# Patient Record
Sex: MALE | Race: WHITE | NOT HISPANIC OR LATINO | Employment: FULL TIME | URBAN - METROPOLITAN AREA
[De-identification: names, ages, dates, MRNs, and addresses within clinical notes are randomized per-mention and may not be internally consistent; named-entity substitution may affect disease eponyms.]

---

## 2018-06-10 ENCOUNTER — HOSPITAL ENCOUNTER (EMERGENCY)
Facility: CLINIC | Age: 38
Discharge: HOME | End: 2018-06-10
Attending: EMERGENCY MEDICINE | Admitting: EMERGENCY MEDICINE
Payer: OTHER MISCELLANEOUS

## 2018-06-10 VITALS
RESPIRATION RATE: 16 BRPM | SYSTOLIC BLOOD PRESSURE: 112 MMHG | DIASTOLIC BLOOD PRESSURE: 82 MMHG | HEART RATE: 82 BPM | TEMPERATURE: 97.7 F | OXYGEN SATURATION: 99 %

## 2018-06-10 DIAGNOSIS — S06.0X0A CONCUSSION WITHOUT LOSS OF CONSCIOUSNESS, INITIAL ENCOUNTER: ICD-10-CM

## 2018-06-10 PROCEDURE — 99283 EMERGENCY DEPT VISIT LOW MDM: CPT

## 2018-06-10 RX ORDER — IBUPROFEN 600 MG/1
600 TABLET, FILM COATED ORAL EVERY 6 HOURS PRN
Qty: 30 TABLET | Refills: 1 | Status: SHIPPED | OUTPATIENT
Start: 2018-06-10

## 2018-06-10 RX ORDER — ONDANSETRON 4 MG/1
4 TABLET, ORALLY DISINTEGRATING ORAL EVERY 8 HOURS PRN
Qty: 10 TABLET | Refills: 0 | Status: SHIPPED | OUTPATIENT
Start: 2018-06-10 | End: 2018-06-13

## 2018-06-10 ASSESSMENT — ENCOUNTER SYMPTOMS
FATIGUE: 1
NECK PAIN: 1
HEADACHES: 1
NAUSEA: 1
VOMITING: 0
PHOTOPHOBIA: 1

## 2018-06-10 NOTE — ED PROVIDER NOTES
History     Chief Complaint:  Head injury    HPI   Yousif Mayo is a 38 year old male who presents with a head injury. The patient reports that 10 days ago he was crouched down picking up some tools while at work when he stood up quickly, hitting his head against a metal pipe. He was wearing a hard hat at the time. The patient did see his PCP initially following this incident. He now presents here to the ED after continuing to experience symptoms including sensitivity to light, neck pain, nausea, pressure like sensation in his head, and slight balance issues. He states he has seen a chiropractor for an adjustment since this incident, but that this did not alleviate his symptoms well. The patient's wife does note that he has been napping and fatigued since his initial injury, which is abnormal for him.     Allergies:  Penicillin G     Medications:    Lexapro  Nicorette     Past Medical History:    Alcohol abuse  Narcotic abuse  Tobacco use disorder    Past Surgical History:    History reviewed. No pertinent past surgical history.    Family History:    GI disease    Social History:  The patient was accompanied to the ED by his wife.  Smoking Status: Former  Smokeless Tobacco: No  Alcohol Use: No  Marital Status:  Single     Review of Systems   Constitutional: Positive for fatigue.   Eyes: Positive for photophobia.   Gastrointestinal: Positive for nausea. Negative for vomiting.   Musculoskeletal: Positive for neck pain.   Neurological: Positive for headaches.   All other systems reviewed and are negative.    Physical Exam     Patient Vitals for the past 24 hrs:   BP Temp Temp src Heart Rate Resp SpO2   06/10/18 1027 123/89 97.7  F (36.5  C) Temporal 84 16 97 %      Physical Exam  General: Patient is alert and interactive when I enter the room  Head:  The scalp, face, and head appear normal  Eyes:  The pupils are equal, round, and reactive to light    Conjunctivae and sclerae are normal  ENT:    External  acoustic canals are normal    The oropharynx is normal without erythema.     Uvula is in the midline  Neck:  Normal range of motion  CV:  Regular rate. S1/S2. No murmurs.   Resp:  Lungs are clear without wheezes or rales. No distress  GI:  Abdomen is soft, no rigidity, guarding, or rebound    No distension. No tenderness to palpation in any quadrant.     MS:  Normal tone. Joints grossly normal without effusions.     No asymmetric leg swelling, calf or thigh tenderness.      Normal motor assessment of all extremities.  Skin:  No rash or lesions noted. Normal capillary refill noted  Neuro: Speech is normal and fluent. Face is symmetric.     Moving all extremities well. CN's are intact. 5/5 UE and LE strength.   Psych:  Awake. Alert.  Normal affect.  Appropriate interactions.  Lymph: No anterior cervical lymphadenopathy noted        Emergency Department Course     Emergency Department Course:  Nursing notes and vitals reviewed.  1107 I had my initial encounter with the patient.  I performed an exam of the patient as documented above.     1127 I discussed the treatment plan with the patient. They expressed understanding of this plan and consented to discharge. They will be discharged home with instructions for care and follow up. In addition, the patient will return to the emergency department with any new or worsening symptoms.  All questions were answered.    Impression & Plan      Medical Decision Making:  Yousif Mayo is a 38 year old male who presents for evaluation after stood up into pipe with trauma to the head.  This patient has a history per a previous workup of concussion.  The differential diagnosis includes skull fracture, epidural hematoma, subdural hematoma, intracerebral hemorrhage, and traumatic subarachnoid hemorrhage; all of these are highly unlikely in this clinical setting.  By the [place] head CT rules they do not warrant head ct imaging and discussed risk/benefit ratio with patient/family  in detail.  Neuro exam is normal here.  I discussed that, if he does in fact have a concussion, the next step in workup.  I doubt imaging 10 days out is vital in the ED today.    Return to ED for red flags (change in behavior, drowsiness, seizures, vomiting, etc) and gave concussion precautions for home.  I did stress importance of avoiding a second concussion while brain heals.  The patients head to toe trauma exam is otherwise negative for serious underlying disease of the head, neck, chest, abdomen, extremities, pelvis        Diagnosis:    ICD-10-CM    1. Concussion without loss of consciousness, initial encounter S06.0X0A      Disposition:   Discharge    Discharge Medications:  New Prescriptions    IBUPROFEN (ADVIL/MOTRIN) 600 MG TABLET    Take 1 tablet (600 mg) by mouth every 6 hours as needed for moderate pain    ONDANSETRON (ZOFRAN ODT) 4 MG ODT TAB    Take 1 tablet (4 mg) by mouth every 8 hours as needed for nausea     Scribe Disclosure:  I, Doc Paulson, am serving as a scribe at 11:06 AM on 6/10/2018 to document services personally performed by Sahil Vasquez MD, based on my observations and the provider's statements to me.       Sahil Vasquez MD  06/10/18 8052

## 2018-06-10 NOTE — DISCHARGE INSTRUCTIONS
Please see your primary care doctor Tuesday for a recheck.      It was my pleasure to take care of you today. Thank you for visiting [facility] Emergency Room.     Sahil Vasquez MD      Concussion Instructions:    Today you saw Sahil Vasquez MD for symptoms of a concussion. Symptoms may include: headache, confusion, feeling sick to your stomach, vomiting (throwing up) and problems with memory, concentration or sleeping. You may feel dizzy, irritable, and tired. Children and teens may need help from their parents, teachers, coaches and others to watch for symptoms as they recover.   Follow-up  Please visit the clinic below for follow-up care. Schedule this visit within the next 2-3 day(s).   Your doctor. If you do not have a doctor, you may choose a clinic at [email or web address], or call [phone], or [phone].      Warning signs  Call your doctor or come back to the Emergency Department if you suddenly have any   of these symptoms:    Headaches that get worse    Feeling more and more drowsy    You keep repeating yourself    Strange behavior    Seizures    Repeat vomiting (throwing up)    Growing confusion    Feeling more irritable    Neck pain that gets worse    Slurred speech    Weakness or numbness    Loss of consciousness  Fluid or blood coming from ears or nose  Self-care    Get lots of rest. Be sure to get enough sleep at night.  Take daytime naps or rest if you feel tired.    Limit physical activity and  thinking  activities. These can make symptoms worse.  - Physical activity includes gym, sports, weight training, running, exercise and heavy lifting.  - Thinking activities include homework, class work and job-related work.    Maintain a healthy diet and drink lots of fluids.      As symptoms improve, you may slowly return to your daily activities. If symptoms get worse   or return, reduce your activities.     Know that it is normal to feel sad and frustrated when you do not feel right and  are less active.           Yousif Mayo         06/10/2018    Going back to work    Your care team will tell you when to return to work based on your symptoms.     Limit the amount of work you do soon after your injury. This may speed healing.   It is important to get a lot of rest. You should also reduce your physical activity as   well as activities that require a lot of thinking or concentration.    At this time:  Return to work in 1 days.    Returning to sports    Never return to play if you have any symptoms. A full recovery will reduce the chances of getting hurt again. Remember, it is better to miss one or two games than a whole season.     After all of your symptoms are gone, and you wait another 7-14 days, it is usually safe to slowly return to activity. If symptoms return or worsen, stop the activity and see your doctor.    Important: If you are under age 18 and in an organized sport, you will need written consent from a doctor before you return to sports. Typically, this will be your primary care or sports medicine doctor. Please make an appointment.     Going back to school    If you are still having symptoms, you may need extra help at school.    Tell your teachers and school nurse about your injury and symptoms. Ask them to watch for problems with learning, memory and concentration. Symptoms may get worse when you do schoolwork, and you may become more irritable.     At this time: Return to school in 1 days.

## 2018-06-10 NOTE — ED AVS SNAPSHOT
[facility] Emergency Department    [address]    [facility] [address]    Phone:  [phone]    Fax:  [phone]                                       Yosuif Mayo   MRN: [id number]    Department:  [facility] Emergency Department   Date of Visit:  6/10/2018           After Visit Summary Signature Page     I have received my discharge instructions, and my questions have been answered. I have discussed any challenges I see with this plan with the nurse or doctor.    ..........................................................................................................................................  Patient/Patient Representative Signature      ..........................................................................................................................................  Patient Representative Print Name and Relationship to Patient    ..................................................               ................................................  Date                                            Time    ..........................................................................................................................................  Reviewed by Signature/Title    ...................................................              ..............................................  Date                                                            Time

## 2018-06-10 NOTE — ED AVS SNAPSHOT
[facility] Emergency Department    [address]    [facility] [address]    Phone:  [phone]    Fax:  [phone]                                       Yousif Mayo   MRN: [id number]    Department:  [facility] Emergency Department   Date of Visit:  6/10/2018           Patient Information     Date Of Birth          1980        Your diagnoses for this visit were:     Concussion without loss of consciousness, initial encounter        You were seen by Sahil Vasquez MD.      Follow-up Information     Follow up with [facility].    Contact information:    [address] [facility] [address]          Discharge Instructions       Please see your primary care doctor Tuesday for a recheck.      It was my pleasure to take care of you today. Thank you for visiting [facility] Emergency Room.     Sahil Vasquez MD      Concussion Instructions:    Today you saw Sahil Vasquez MD for symptoms of a concussion. Symptoms may include: headache, confusion, feeling sick to your stomach, vomiting (throwing up) and problems with memory, concentration or sleeping. You may feel dizzy, irritable, and tired. Children and teens may need help from their parents, teachers, coaches and others to watch for symptoms as they recover.   Follow-up  Please visit the clinic below for follow-up care. Schedule this visit within the next 2-3 day(s).   Your doctor. If you do not have a doctor, you may choose a clinic at [email or web address], or call [phone], or [phone].      Warning signs  Call your doctor or come back to the Emergency Department if you suddenly have any   of these symptoms:    Headaches that get worse    Feeling more and more drowsy    You keep repeating yourself    Strange behavior    Seizures    Repeat vomiting (throwing up)    Growing confusion    Feeling more irritable    Neck pain that gets worse    Slurred speech    Weakness or  numbness    Loss of consciousness  Fluid or blood coming from ears or nose  Self-care    Get lots of rest. Be sure to get enough sleep at night.  Take daytime naps or rest if you feel tired.    Limit physical activity and  thinking  activities. These can make symptoms worse.  - Physical activity includes gym, sports, weight training, running, exercise and heavy lifting.  - Thinking activities include homework, class work and job-related work.    Maintain a healthy diet and drink lots of fluids.      As symptoms improve, you may slowly return to your daily activities. If symptoms get worse   or return, reduce your activities.     Know that it is normal to feel sad and frustrated when you do not feel right and are less active.           Yousif Mayo         06/10/2018    Going back to work    Your care team will tell you when to return to work based on your symptoms.     Limit the amount of work you do soon after your injury. This may speed healing.   It is important to get a lot of rest. You should also reduce your physical activity as   well as activities that require a lot of thinking or concentration.    At this time:  Return to work in 1 days.    Returning to sports    Never return to play if you have any symptoms. A full recovery will reduce the chances of getting hurt again. Remember, it is better to miss one or two games than a whole season.     After all of your symptoms are gone, and you wait another 7-14 days, it is usually safe to slowly return to activity. If symptoms return or worsen, stop the activity and see your doctor.    Important: If you are under age 18 and in an organized sport, you will need written consent from a doctor before you return to sports. Typically, this will be your primary care or sports medicine doctor. Please make an appointment.     Going back to school    If you are still having symptoms, you may need extra help at school.    Tell your teachers and school nurse about your  injury and symptoms. Ask them to watch for problems with learning, memory and concentration. Symptoms may get worse when you do schoolwork, and you may become more irritable.     At this time: Return to school in 1 days.          24 Hour Appointment Hotline       To make an appointment at any [facility] clinic, call [phone] ([phone]). If you don't have a family doctor or clinic, we will help you find one. [facility] clinics are conveniently located to serve the needs of you and your family.             Review of your medicines      START taking        Dose / Directions Last dose taken    ibuprofen 600 MG tablet   Commonly known as:  ADVIL/MOTRIN   Dose:  600 mg   Quantity:  30 tablet        Take 1 tablet (600 mg) by mouth every 6 hours as needed for moderate pain   Refills:  1        ondansetron 4 MG ODT tab   Commonly known as:  ZOFRAN ODT   Dose:  4 mg   Quantity:  10 tablet        Take 1 tablet (4 mg) by mouth every 8 hours as needed for nausea   Refills:  0          Our records show that you are taking the medicines listed below. If these are incorrect, please call your family doctor or clinic.        Dose / Directions Last dose taken    LEXAPRO PO   Dose:  5 mg        Take 5 mg by mouth   Refills:  0        multivitamin per tablet   Quantity:  100        ONE DAILY   Refills:  1 YEAR        NICORETTE 2 MG gum   Quantity:  1 box   Generic drug:  nicotine polacrilex        1 to 2 PIECES OF GUM AS NEEDED   Refills:  prn                Prescriptions were sent or printed at these locations (2 Prescriptions)                   Other Prescriptions                Printed at Department/Unit printer (2 of 2)         ibuprofen (ADVIL/MOTRIN) 600 MG tablet               ondansetron (ZOFRAN ODT) 4 MG ODT tab                Orders Needing Specimen Collection     None      Pending Results     No orders found from 6/8/2018 to 6/11/2018.            Pending Culture Results     No orders found from 6/8/2018 to 6/11/2018.             Pending Results Instructions     If you had any lab results that were not finalized at the time of your Discharge, you can call the ED Lab Result RN at [phone]. You will be contacted by this team for any positive Lab results or changes in treatment. The nurses are available 7 days a week from 10A to 6:30P.  You can leave a message 24 hours per day and they will return your call.        Test Results From Your Hospital Stay               Clinical Quality Measure: Blood Pressure Screening     Your blood pressure was checked while you were in the emergency department today. The last reading we obtained was  BP: 123/89 . Please read the guidelines below about what these numbers mean and what you should do about them.  If your systolic blood pressure (the top number) is less than 120 and your diastolic blood pressure (the bottom number) is less than 80, then your blood pressure is normal. There is nothing more that you need to do about it.  If your systolic blood pressure (the top number) is 120-139 or your diastolic blood pressure (the bottom number) is 80-89, your blood pressure may be higher than it should be. You should have your blood pressure rechecked within a year by a primary care provider.  If your systolic blood pressure (the top number) is 140 or greater or your diastolic blood pressure (the bottom number) is 90 or greater, you may have high blood pressure. High blood pressure is treatable, but if left untreated over time it can put you at risk for heart attack, stroke, or kidney failure. You should have your blood pressure rechecked by a primary care provider within the next 4 weeks.  If your provider in the emergency department today gave you specific instructions to follow-up with your doctor or provider even sooner than that, you should follow that instruction and not wait for up to 4 weeks for your follow-up visit.        Thank you for choosing [facility]       Thank you for choosing  "[facility] for your care. Our goal is always to provide you with excellent care. Hearing back from our patients is one way we can continue to improve our services. Please take a few minutes to complete the written survey that you may receive in the mail after you visit with us. Thank you!        [employer] Information     [employer] lets you send messages to your doctor, view your test results, renew your prescriptions, schedule appointments and more. To sign up, go to [email or web address] . Click on \"Log in\" on the left side of the screen, which will take you to the Welcome page. Then click on \"Sign up Now\" on the right side of the page.     You will be asked to enter the access code listed below, as well as some personal information. Please follow the directions to create your username and password.     Your access code is: [email or web address]  Expires: 2018 11:32 AM     Your access code will  in 90 days. If you need help or a new code, please call your [facility] clinic or [phone].        Care EveryWhere ID     This is your Care EveryWhere ID. This could be used by other organizations to access your [facility] medical records  [id number]        Equal Access to Services     DONTAE ALVAREZ : Viry Cardoso, marquise padilla, qaavelina kaalanrgis arrieta, ayala longoria. So [facility] [phone].    ATENCIÓN: Si habla español, tiene a marrufo disposición servicios gratuitos de asistencia lingüística. Llame al [phone].    We comply with applicable federal civil rights laws and [place] laws. We do not discriminate on the basis of race, color, national origin, age, disability, sex, sexual orientation, or gender identity.            After Visit Summary       This is your record. Keep this with you and show to your community pharmacist(s) and doctor(s) at your next visit.                  "

## 2018-06-10 NOTE — ED TRIAGE NOTES
Pt with head injury 10 days ago. Pt stood up fast and hit head on metal pipe. Was seen by doctor and informed to return if symptoms worsen. Pt with increased with lethargy, tiredness, nausea, photophobia. Thinks has had previous concussions in past. ABCs intact.

## 2023-01-18 ENCOUNTER — LAB REQUISITION (OUTPATIENT)
Dept: LAB | Facility: CLINIC | Age: 43
End: 2023-01-18
Payer: COMMERCIAL

## 2023-01-18 DIAGNOSIS — R53.83 OTHER FATIGUE: ICD-10-CM

## 2023-01-18 DIAGNOSIS — R10.9 UNSPECIFIED ABDOMINAL PAIN: ICD-10-CM

## 2023-01-18 LAB
ALBUMIN SERPL BCG-MCNC: 4.6 G/DL (ref 3.5–5.2)
ALP SERPL-CCNC: 88 U/L (ref 40–129)
ALT SERPL W P-5'-P-CCNC: 39 U/L (ref 10–50)
ANION GAP SERPL CALCULATED.3IONS-SCNC: 15 MMOL/L (ref 7–15)
AST SERPL W P-5'-P-CCNC: 31 U/L (ref 10–50)
BILIRUB SERPL-MCNC: 0.3 MG/DL
BUN SERPL-MCNC: 18.9 MG/DL (ref 6–20)
CALCIUM SERPL-MCNC: 9.5 MG/DL (ref 8.6–10)
CHLORIDE SERPL-SCNC: 102 MMOL/L (ref 98–107)
CREAT SERPL-MCNC: 1.24 MG/DL (ref 0.67–1.17)
DEPRECATED HCO3 PLAS-SCNC: 23 MMOL/L (ref 22–29)
GFR SERPL CREATININE-BSD FRML MDRD: 74 ML/MIN/1.73M2
GLUCOSE SERPL-MCNC: 96 MG/DL (ref 70–99)
HBA1C MFR BLD: 5.5 %
POTASSIUM SERPL-SCNC: 4.5 MMOL/L (ref 3.4–5.3)
PROT SERPL-MCNC: 6.9 G/DL (ref 6.4–8.3)
SODIUM SERPL-SCNC: 140 MMOL/L (ref 136–145)
TSH SERPL DL<=0.005 MIU/L-ACNC: 2.16 UIU/ML (ref 0.3–4.2)

## 2023-01-18 PROCEDURE — 80053 COMPREHEN METABOLIC PANEL: CPT | Mod: ORL | Performed by: FAMILY MEDICINE

## 2023-01-18 PROCEDURE — 83036 HEMOGLOBIN GLYCOSYLATED A1C: CPT | Mod: ORL | Performed by: FAMILY MEDICINE

## 2023-01-18 PROCEDURE — 84443 ASSAY THYROID STIM HORMONE: CPT | Mod: ORL | Performed by: FAMILY MEDICINE
